# Patient Record
Sex: MALE | Race: WHITE | NOT HISPANIC OR LATINO | Employment: OTHER | URBAN - METROPOLITAN AREA
[De-identification: names, ages, dates, MRNs, and addresses within clinical notes are randomized per-mention and may not be internally consistent; named-entity substitution may affect disease eponyms.]

---

## 2022-09-16 ENCOUNTER — LAB VISIT (OUTPATIENT)
Dept: LAB | Facility: HOSPITAL | Age: 87
End: 2022-09-16
Attending: OTOLARYNGOLOGY
Payer: MEDICARE

## 2022-09-16 DIAGNOSIS — D64.9 ANEMIA: Primary | ICD-10-CM

## 2022-09-16 LAB
ALBUMIN SERPL BCP-MCNC: 3.6 G/DL (ref 3.5–5.2)
ALP SERPL-CCNC: 54 U/L (ref 55–135)
ALT SERPL W/O P-5'-P-CCNC: 13 U/L (ref 10–44)
ANION GAP SERPL CALC-SCNC: 10 MMOL/L (ref 8–16)
AST SERPL-CCNC: 13 U/L (ref 10–40)
BILIRUB SERPL-MCNC: 0.7 MG/DL (ref 0.1–1)
BUN SERPL-MCNC: 26 MG/DL (ref 10–30)
CALCIUM SERPL-MCNC: 8.7 MG/DL (ref 8.7–10.5)
CHLORIDE SERPL-SCNC: 107 MMOL/L (ref 95–110)
CO2 SERPL-SCNC: 23 MMOL/L (ref 23–29)
CREAT SERPL-MCNC: 1.2 MG/DL (ref 0.5–1.4)
ERYTHROCYTE [DISTWIDTH] IN BLOOD BY AUTOMATED COUNT: 13.5 % (ref 11.5–14.5)
EST. GFR  (NO RACE VARIABLE): 56.7 ML/MIN/1.73 M^2
FOLATE SERPL-MCNC: 11.4 NG/ML (ref 4–24)
GLUCOSE SERPL-MCNC: 103 MG/DL (ref 70–110)
HCT VFR BLD AUTO: 36.8 % (ref 40–54)
HGB BLD-MCNC: 12.1 G/DL (ref 14–18)
IRON SERPL-MCNC: 82 UG/DL (ref 45–160)
MCH RBC QN AUTO: 30.3 PG (ref 27–31)
MCHC RBC AUTO-ENTMCNC: 32.9 G/DL (ref 32–36)
MCV RBC AUTO: 92 FL (ref 82–98)
PLATELET # BLD AUTO: 197 K/UL (ref 150–450)
PMV BLD AUTO: 9.4 FL (ref 9.2–12.9)
POTASSIUM SERPL-SCNC: 4.3 MMOL/L (ref 3.5–5.1)
PROT SERPL-MCNC: 6.5 G/DL (ref 6–8.4)
RBC # BLD AUTO: 3.99 M/UL (ref 4.6–6.2)
SODIUM SERPL-SCNC: 140 MMOL/L (ref 136–145)
VIT B12 SERPL-MCNC: 256 PG/ML (ref 210–950)
WBC # BLD AUTO: 8.43 K/UL (ref 3.9–12.7)

## 2022-09-16 PROCEDURE — 82746 ASSAY OF FOLIC ACID SERUM: CPT | Performed by: OTOLARYNGOLOGY

## 2022-09-16 PROCEDURE — 36415 COLL VENOUS BLD VENIPUNCTURE: CPT | Performed by: OTOLARYNGOLOGY

## 2022-09-16 PROCEDURE — 83540 ASSAY OF IRON: CPT | Performed by: OTOLARYNGOLOGY

## 2022-09-16 PROCEDURE — 85027 COMPLETE CBC AUTOMATED: CPT | Performed by: OTOLARYNGOLOGY

## 2022-09-16 PROCEDURE — 80053 COMPREHEN METABOLIC PANEL: CPT | Performed by: OTOLARYNGOLOGY

## 2022-09-16 PROCEDURE — 82607 VITAMIN B-12: CPT | Performed by: OTOLARYNGOLOGY

## 2024-06-05 ENCOUNTER — TELEPHONE (OUTPATIENT)
Dept: OTOLARYNGOLOGY | Facility: CLINIC | Age: 89
End: 2024-06-05
Payer: MEDICARE

## 2024-06-05 NOTE — TELEPHONE ENCOUNTER
----- Message from Allison Roberts sent at 6/5/2024  1:08 PM CDT -----  Regarding: Referral  Hello,    Provider Buster Hernández would like to refer the patient to the ENT department.     The patients diagnosis is: right ear lesion    I have scanned the patients referral and records into .     Please review and contact patient to schedule appointment. If there are no appointments available at this time, please advise and I will inform the referring provider/clinic      Thank you,   Allison Roblero

## 2024-06-06 ENCOUNTER — OFFICE VISIT (OUTPATIENT)
Dept: OTOLARYNGOLOGY | Facility: CLINIC | Age: 89
End: 2024-06-06
Payer: MEDICARE

## 2024-06-06 VITALS — WEIGHT: 227.63 LBS | HEIGHT: 74 IN | BODY MASS INDEX: 29.21 KG/M2

## 2024-06-06 DIAGNOSIS — H93.90 EAR LESION: Primary | ICD-10-CM

## 2024-06-06 PROCEDURE — 99203 OFFICE O/P NEW LOW 30 MIN: CPT | Mod: S$GLB,,, | Performed by: OTOLARYNGOLOGY

## 2024-06-06 PROCEDURE — 1126F AMNT PAIN NOTED NONE PRSNT: CPT | Mod: CPTII,S$GLB,, | Performed by: OTOLARYNGOLOGY

## 2024-06-06 PROCEDURE — 1160F RVW MEDS BY RX/DR IN RCRD: CPT | Mod: CPTII,S$GLB,, | Performed by: OTOLARYNGOLOGY

## 2024-06-06 PROCEDURE — 1159F MED LIST DOCD IN RCRD: CPT | Mod: CPTII,S$GLB,, | Performed by: OTOLARYNGOLOGY

## 2024-06-06 PROCEDURE — 99999 PR PBB SHADOW E&M-EST. PATIENT-LVL II: CPT | Mod: PBBFAC,,, | Performed by: OTOLARYNGOLOGY

## 2024-06-06 PROCEDURE — 3288F FALL RISK ASSESSMENT DOCD: CPT | Mod: CPTII,S$GLB,, | Performed by: OTOLARYNGOLOGY

## 2024-06-06 PROCEDURE — 1101F PT FALLS ASSESS-DOCD LE1/YR: CPT | Mod: CPTII,S$GLB,, | Performed by: OTOLARYNGOLOGY

## 2024-06-06 RX ORDER — LOSARTAN POTASSIUM 50 MG/1
50 TABLET ORAL EVERY MORNING
COMMUNITY
Start: 2024-05-13

## 2024-06-06 RX ORDER — SERTRALINE HYDROCHLORIDE 50 MG/1
TABLET, FILM COATED ORAL
COMMUNITY
Start: 2022-05-01

## 2024-06-06 RX ORDER — HYDROXYZINE HYDROCHLORIDE 25 MG/1
25 TABLET, FILM COATED ORAL
COMMUNITY
Start: 2024-05-22

## 2024-06-06 RX ORDER — MUPIROCIN 20 MG/G
OINTMENT TOPICAL
Qty: 1 EACH | Refills: 5 | Status: SHIPPED | OUTPATIENT
Start: 2024-06-06

## 2024-06-06 RX ORDER — TAMSULOSIN HYDROCHLORIDE 0.4 MG/1
CAPSULE ORAL
COMMUNITY
Start: 2022-05-01

## 2024-06-06 RX ORDER — ATORVASTATIN CALCIUM 20 MG/1
TABLET, FILM COATED ORAL
COMMUNITY

## 2024-06-06 RX ORDER — PROPRANOLOL HYDROCHLORIDE 20 MG/1
40 TABLET ORAL EVERY MORNING
COMMUNITY
Start: 2024-04-02

## 2024-06-06 RX ORDER — TRAMADOL HYDROCHLORIDE 50 MG/1
TABLET ORAL
COMMUNITY

## 2024-06-06 NOTE — PROGRESS NOTES
Subjective:       Patient ID: Kenn Castrejon is a 89 y.o. male.    Chief Complaint: Other (Pt c/o lesion on right ear for a couple of weeks. )      This extremely alert seemingly quite healthy 89-year-old gentleman comes in to discuss an issue with his right ear.  Tells me for maybe two months he has had a soreness over his right ear and a lesion and he has been applying Neosporin type ointment.          Objective:      ENT Physical Exam  Drawings         So he has a somewhat curious lesion of the helix on the right and it follows the contour of the helix and a way that has not typically consistent with a skin cancer it looks more like a pressure ulceration and perhaps resultant impetigo or staph infection.    As I have discussed with the patient it isn't typical for a skin cancer but if we do not get this to heal up we will not dismiss that possibility and concerned.        Assessment:       1. Ear lesion         Plan:          So I wanted him to do two things, one is two apply mupirocin ointment on it several times a day and then at night when he sleeping prepare some type of Cushing over this and we have discussed the options that include some 4 x 4 gauze and perhaps a Jenna cough headband or an Ace wrap that he can wrap around there every night both to hold the ointment against the skin there and to reduce any pressure that is being applied because he does like to sleep on that side down.    I want to see him back in a few weeks the lesion does not have a typical appearance of a skin cancer but we need to be sure it heals up and not dismiss that concern if it does not.

## 2024-06-27 ENCOUNTER — OFFICE VISIT (OUTPATIENT)
Dept: OTOLARYNGOLOGY | Facility: CLINIC | Age: 89
End: 2024-06-27
Payer: MEDICARE

## 2024-06-27 VITALS — BODY MASS INDEX: 29.13 KG/M2 | WEIGHT: 227 LBS | HEIGHT: 74 IN

## 2024-06-27 DIAGNOSIS — H93.90 EAR LESION: Primary | ICD-10-CM

## 2024-06-27 PROCEDURE — 99213 OFFICE O/P EST LOW 20 MIN: CPT | Mod: S$GLB,,, | Performed by: OTOLARYNGOLOGY

## 2024-06-27 PROCEDURE — 99999 PR PBB SHADOW E&M-EST. PATIENT-LVL III: CPT | Mod: PBBFAC,,, | Performed by: OTOLARYNGOLOGY

## 2024-06-27 PROCEDURE — 1125F AMNT PAIN NOTED PAIN PRSNT: CPT | Mod: CPTII,S$GLB,, | Performed by: OTOLARYNGOLOGY

## 2024-06-27 PROCEDURE — 1160F RVW MEDS BY RX/DR IN RCRD: CPT | Mod: CPTII,S$GLB,, | Performed by: OTOLARYNGOLOGY

## 2024-06-27 PROCEDURE — 1159F MED LIST DOCD IN RCRD: CPT | Mod: CPTII,S$GLB,, | Performed by: OTOLARYNGOLOGY

## 2024-06-27 PROCEDURE — 3288F FALL RISK ASSESSMENT DOCD: CPT | Mod: CPTII,S$GLB,, | Performed by: OTOLARYNGOLOGY

## 2024-06-27 PROCEDURE — 1101F PT FALLS ASSESS-DOCD LE1/YR: CPT | Mod: CPTII,S$GLB,, | Performed by: OTOLARYNGOLOGY

## 2024-06-27 NOTE — PROGRESS NOTES
Subjective:       Patient ID: Kenn Castrejon is a 89 y.o. male.    Chief Complaint: Ear Problem (Pt c/o right ear lesion. )      This gentleman returns having been seen by me recently with a lesion of the external ear on the right side it had characteristics of pressure  ulceration with possibly secondary infection and he has been treating it with topical mupirocin and he has been applying a Cushing to that area when he sleeps describing that he does it with some tissues and a cheap COVID facemask.  In any case he tells me it is feeling better          Objective:      ENT Physical Exam  Drawings         As shown in the updated drawing the area in question has healed up except for the part that was deepest near the root of the helix and it looks much better with no surrounding redness and not nearly as deep of an ulceration.        Assessment:       1. Ear lesion         Plan:          So the lesion is much better there still a little bit that needs to go but since he has got a plan that is seems to be allowing it to heal both applying mupirocin and cushioning it when he sleeps I think we will just continue on that pathway and expect it to complete healing.  I have asked him to contact me if they progress Stalls or if it goes backwards.  There maybe a role for a different type of ear protect her for him to wear when he sleeps.

## 2024-07-01 ENCOUNTER — OFFICE VISIT (OUTPATIENT)
Dept: URGENT CARE | Facility: CLINIC | Age: 89
End: 2024-07-01
Payer: MEDICARE

## 2024-07-01 VITALS
OXYGEN SATURATION: 98 % | SYSTOLIC BLOOD PRESSURE: 146 MMHG | TEMPERATURE: 98 F | BODY MASS INDEX: 28.86 KG/M2 | DIASTOLIC BLOOD PRESSURE: 85 MMHG | HEART RATE: 98 BPM | WEIGHT: 224.88 LBS | HEIGHT: 74 IN | RESPIRATION RATE: 18 BRPM

## 2024-07-01 DIAGNOSIS — R05.9 COUGH, UNSPECIFIED TYPE: ICD-10-CM

## 2024-07-01 DIAGNOSIS — J32.9 BACTERIAL SINUSITIS: Primary | ICD-10-CM

## 2024-07-01 DIAGNOSIS — B96.89 BACTERIAL SINUSITIS: Primary | ICD-10-CM

## 2024-07-01 DIAGNOSIS — J34.9 SINUS TROUBLE: ICD-10-CM

## 2024-07-01 LAB
CTP QC/QA: YES
CTP QC/QA: YES
POC MOLECULAR INFLUENZA A AGN: NEGATIVE
POC MOLECULAR INFLUENZA B AGN: NEGATIVE
SARS-COV-2 AG RESP QL IA.RAPID: NEGATIVE

## 2024-07-01 PROCEDURE — 87811 SARS-COV-2 COVID19 W/OPTIC: CPT | Mod: QW,S$GLB,, | Performed by: NURSE PRACTITIONER

## 2024-07-01 PROCEDURE — 87502 INFLUENZA DNA AMP PROBE: CPT | Mod: QW,,, | Performed by: NURSE PRACTITIONER

## 2024-07-01 PROCEDURE — 99204 OFFICE O/P NEW MOD 45 MIN: CPT | Mod: S$GLB,,, | Performed by: NURSE PRACTITIONER

## 2024-07-01 RX ORDER — TRAZODONE HYDROCHLORIDE 50 MG/1
TABLET ORAL
COMMUNITY

## 2024-07-01 RX ORDER — AMOXICILLIN 500 MG/1
500 CAPSULE ORAL EVERY 8 HOURS
Qty: 30 CAPSULE | Refills: 0 | Status: SHIPPED | OUTPATIENT
Start: 2024-07-01 | End: 2024-07-11

## 2024-07-01 RX ORDER — PREDNISONE 10 MG/1
10 TABLET ORAL DAILY
Qty: 4 TABLET | Refills: 0 | Status: SHIPPED | OUTPATIENT
Start: 2024-07-01 | End: 2024-07-05

## 2024-07-01 NOTE — PROGRESS NOTES
"Subjective:      Patient ID: Kenn Castrejon is a 89 y.o. male.    Vitals:  height is 6' 2" (1.88 m) and weight is 102 kg (224 lb 13.9 oz). His oral temperature is 97.8 °F (36.6 °C). His blood pressure is 146/85 (abnormal) and his pulse is 98. His respiration is 18 and oxygen saturation is 98%.     Chief Complaint: Sinus Problem and Fever    89yo afebrile male here for sinus congestion, sinus pressure, body aches, and "temperature of 99.0" for the past several days. He denies any other symptoms or complaints.    Sinus Problem  This is a new problem. The current episode started in the past 7 days. The problem is unchanged. There has been no fever. Associated symptoms include congestion and sinus pressure.   Fever   This is a new problem. The current episode started 1 day ago. The problem has been unchanged. He has not experienced a heat injury.His temperature was unmeasured prior to arrival. Associated symptoms include congestion.       HENT:  Positive for congestion and sinus pressure.       Objective:     Physical Exam   Constitutional: He is oriented to person, place, and time.  Non-toxic appearance. He does not appear ill. No distress. normal  HENT:   Head: Normocephalic and atraumatic.   Ears:   Right Ear: Tympanic membrane, external ear and ear canal normal.   Left Ear: Tympanic membrane, external ear and ear canal normal.   Nose: Congestion present. Right sinus exhibits maxillary sinus tenderness. Left sinus exhibits maxillary sinus tenderness.   Mouth/Throat: Mucous membranes are moist. No posterior oropharyngeal erythema. Oropharynx is clear.   Eyes: Conjunctivae are normal. Extraocular movement intact   Neck: Neck supple. No neck rigidity present.   Cardiovascular: Normal rate, regular rhythm, normal heart sounds and normal pulses.   Pulmonary/Chest: Effort normal and breath sounds normal.   Abdominal: Normal appearance.   Musculoskeletal: Normal range of motion.         General: Normal range of motion. "   Lymphadenopathy:     He has no cervical adenopathy.   Neurological: He is alert and oriented to person, place, and time.   Skin: Skin is warm, dry, not diaphoretic and no rash.   Psychiatric: His behavior is normal.   Vitals reviewed.    Results for orders placed or performed in visit on 07/01/24   SARS Coronavirus 2 Antigen, POCT Manual Read   Result Value Ref Range    SARS Coronavirus 2 Antigen Negative Negative     Acceptable Yes    POCT Influenza A/B MOLECULAR   Result Value Ref Range    POC Molecular Influenza A Ag Negative Negative    POC Molecular Influenza B Ag Negative Negative     Acceptable Yes     XR CHEST PA AND LATERAL    Result Date: 7/1/2024  EXAMINATION: XR CHEST PA AND LATERAL CLINICAL HISTORY: Cough, unspecified TECHNIQUE: PA and lateral views of the chest were performed. COMPARISON: None FINDINGS: The lungs are clear.  No focal consolidation.  Heart size is normal.  Prior CABG.  Mediastinal contours unremarkable. Bony thorax intact.     No acute chest disease. Electronically signed by: Colin Harper Date:    07/01/2024 Time:    12:01       Assessment:     1. Bacterial sinusitis    2. Cough, unspecified type    3. Sinus trouble        Plan:       Bacterial sinusitis  -     amoxicillin (AMOXIL) 500 MG capsule; Take 1 capsule (500 mg total) by mouth every 8 (eight) hours. for 10 days  Dispense: 30 capsule; Refill: 0  -     predniSONE (DELTASONE) 10 MG tablet; Take 1 tablet (10 mg total) by mouth once daily. for 4 days  Dispense: 4 tablet; Refill: 0    Cough, unspecified type  -     SARS Coronavirus 2 Antigen, POCT Manual Read  -     POCT Influenza A/B MOLECULAR  -     XR CHEST PA AND LATERAL; Future; Expected date: 07/01/2024    Sinus trouble  -     SARS Coronavirus 2 Antigen, POCT Manual Read      INSTRUCTIONS  Meds as prescribed. Follow up as advised.

## 2024-10-12 ENCOUNTER — OFFICE VISIT (OUTPATIENT)
Dept: URGENT CARE | Facility: CLINIC | Age: 89
End: 2024-10-12
Payer: MEDICARE

## 2024-10-12 VITALS
OXYGEN SATURATION: 96 % | WEIGHT: 221.81 LBS | DIASTOLIC BLOOD PRESSURE: 70 MMHG | HEIGHT: 74 IN | TEMPERATURE: 99 F | BODY MASS INDEX: 28.47 KG/M2 | HEART RATE: 76 BPM | SYSTOLIC BLOOD PRESSURE: 138 MMHG | RESPIRATION RATE: 18 BRPM

## 2024-10-12 DIAGNOSIS — N39.0 URINARY TRACT INFECTION WITH HEMATURIA, SITE UNSPECIFIED: Primary | ICD-10-CM

## 2024-10-12 DIAGNOSIS — R31.9 URINARY TRACT INFECTION WITH HEMATURIA, SITE UNSPECIFIED: Primary | ICD-10-CM

## 2024-10-12 LAB
BILIRUBIN, UA POC OHS: ABNORMAL
BLOOD, UA POC OHS: ABNORMAL
CLARITY, UA POC OHS: ABNORMAL
COLOR, UA POC OHS: ABNORMAL
GLUCOSE, UA POC OHS: NEGATIVE
KETONES, UA POC OHS: NEGATIVE
LEUKOCYTES, UA POC OHS: ABNORMAL
NITRITE, UA POC OHS: NEGATIVE
PH, UA POC OHS: 7
PROTEIN, UA POC OHS: 100
SPECIFIC GRAVITY, UA POC OHS: 1.02
UROBILINOGEN, UA POC OHS: 0.2

## 2024-10-12 PROCEDURE — 81003 URINALYSIS AUTO W/O SCOPE: CPT | Mod: QW,S$GLB,, | Performed by: NURSE PRACTITIONER

## 2024-10-12 PROCEDURE — 99213 OFFICE O/P EST LOW 20 MIN: CPT | Mod: S$GLB,,, | Performed by: NURSE PRACTITIONER

## 2024-10-12 PROCEDURE — 87086 URINE CULTURE/COLONY COUNT: CPT | Performed by: NURSE PRACTITIONER

## 2024-10-12 RX ORDER — SULFAMETHOXAZOLE AND TRIMETHOPRIM 800; 160 MG/1; MG/1
1 TABLET ORAL 2 TIMES DAILY
Qty: 20 TABLET | Refills: 0 | Status: SHIPPED | OUTPATIENT
Start: 2024-10-12 | End: 2024-10-22

## 2024-10-12 NOTE — PROGRESS NOTES
"Subjective:      Patient ID: Kenn Castrejon is a 89 y.o. male.    Vitals:  height is 6' 2" (1.88 m) and weight is 100.6 kg (221 lb 12.8 oz). His oral temperature is 98.9 °F (37.2 °C). His blood pressure is 138/70 and his pulse is 76. His respiration is 18 and oxygen saturation is 96%.     Chief Complaint: Hematuria    This is a 89 y.o. male who presents today with a chief complaint of  Patient presents with hematuria that began around 4:00AM. Patient reports having the urge to urinate,but he couldn't so he than self cathed himself. Afterwards is whenever he noticed the blood in his urine. Reports no pain or any other symptoms.     Hematuria  This is a new problem. The current episode started today. The problem is unchanged. His pain is at a severity of 0/10. He is experiencing no pain. He describes his urine color as bright red. Irritative symptoms include urgency.       Constitution: Negative.   HENT: Negative.     Cardiovascular: Negative.    Respiratory: Negative.     Gastrointestinal: Negative.    Genitourinary:  Positive for urgency and hematuria.      Objective:     Physical Exam   HENT:   Head: Normocephalic.   Pulmonary/Chest: Effort normal and breath sounds normal.   Abdominal: Normal appearance and bowel sounds are normal. He exhibits no distension and no mass. Soft. There is no abdominal tenderness. There is no rebound and no guarding. No hernia.   Neurological: He is alert.   Skin: Skin is warm and dry.       Assessment:     1. Urinary tract infection with hematuria, site unspecified        Plan:       Urinary tract infection with hematuria, site unspecified  -     POCT Urinalysis(Instrument)  -     sulfamethoxazole-trimethoprim 800-160mg (BACTRIM DS) 800-160 mg Tab; Take 1 tablet by mouth 2 (two) times daily. for 10 days  Dispense: 20 tablet; Refill: 0  -     Urine culture      Results for orders placed or performed in visit on 10/12/24   POCT Urinalysis(Instrument)    Collection Time: 10/12/24 10:59 " AM   Result Value Ref Range    Color, POC UA Red (A) Yellow, Straw, Colorless    Clarity, POC UA Cloudy (A) Clear    Glucose, POC UA Negative Negative    Bilirubin, POC UA Small (A) Negative    Ketones, POC UA Negative Negative    Spec Grav POC UA 1.020 1.005 - 1.030    Blood, POC UA Large (A) Negative    pH, POC UA 7.0 5.0 - 8.0    Protein, POC  (A) Negative    Urobilinogen, POC UA 0.2 <=1.0    Nitrite, POC UA Negative Negative    WBC, POC UA Small (A) Negative     Patient Instructions     UTI (Urinary Tract Infection)    Cranberry juice may help. Get the 100% cranberry juice and mix 4 oz of juice with 4 oz of water and drink this 8 oz glass of liquid once a day.     Increase water intake to at least 8-10 glasses/day.    Avoid caffeine, alcohol, or spicy foods as they irritate the bladder.      If you take OTC AZO/Pyridium/Phenazopyridine, follow instructions as directed.  Do NOT take for more than 2 days.  Do not wear contacts with Pyridium as it will stain them.  You may want to wear a panty liner with Pyridium as it may stain your underwear.    If you had cultures done it will take 3-5 days to result. We will call you with the result.    If you are are female and on birth control pills use additional methods (such as condom use) to prevent pregnancy while on the antibiotics and for one cycle after.     If your condition worsens or fails to improve we recommend that you receive another evaluation at the ER immediately or contact your PCP to discuss your concerns or return here.

## 2024-10-14 LAB
BACTERIA UR CULT: NORMAL
BACTERIA UR CULT: NORMAL